# Patient Record
Sex: FEMALE | URBAN - METROPOLITAN AREA
[De-identification: names, ages, dates, MRNs, and addresses within clinical notes are randomized per-mention and may not be internally consistent; named-entity substitution may affect disease eponyms.]

---

## 2018-11-19 ENCOUNTER — APPOINTMENT (RX ONLY)
Dept: URBAN - METROPOLITAN AREA CLINIC 9 | Facility: CLINIC | Age: 23
Setting detail: DERMATOLOGY
End: 2018-11-19

## 2018-11-19 DIAGNOSIS — L81.4 OTHER MELANIN HYPERPIGMENTATION: ICD-10-CM

## 2018-11-19 PROCEDURE — ? PRESCRIPTION SAMPLES PROVIDED

## 2018-11-19 PROCEDURE — ? COUNSELING

## 2018-11-19 PROCEDURE — 99202 OFFICE O/P NEW SF 15 MIN: CPT

## 2018-11-19 PROCEDURE — ? RECOMMENDATIONS

## 2018-11-19 PROCEDURE — ? TREATMENT REGIMEN

## 2018-11-19 ASSESSMENT — LOCATION DETAILED DESCRIPTION DERM
LOCATION DETAILED: LEFT SUPERIOR CENTRAL MALAR CHEEK
LOCATION DETAILED: RIGHT SUPERIOR CENTRAL MALAR CHEEK

## 2018-11-19 ASSESSMENT — LOCATION SIMPLE DESCRIPTION DERM
LOCATION SIMPLE: RIGHT CHEEK
LOCATION SIMPLE: LEFT CHEEK

## 2018-11-19 ASSESSMENT — LOCATION ZONE DERM: LOCATION ZONE: FACE

## 2018-11-19 NOTE — PROCEDURE: TREATMENT REGIMEN
Initiate Treatment: Cetaphil cleanser
Continue Regimen: Zyrtec daily
Discontinue Regimen: Face wipes and facial scrubs
Otc Regimen: Aquaphor twice daily
Detail Level: Zone

## 2018-11-19 NOTE — PROCEDURE: RECOMMENDATIONS
Detail Level: Zone
Recommendations (Free Text): See ophthalmologist for prescription eye drops. Also recommend to talk with Aurelia for free consultation
Recommendation Preamble: The following recommendations were made during the visit:

## 2018-11-19 NOTE — HPI: DISCOLORATION
Additional History: She has had allergies since age 14. She admits to often rubbing her eyes. She uses beaded scrubs and make up removing wipes.

## 2018-12-03 ENCOUNTER — APPOINTMENT (RX ONLY)
Dept: URBAN - METROPOLITAN AREA CLINIC 9 | Facility: CLINIC | Age: 23
Setting detail: DERMATOLOGY
End: 2018-12-03

## 2018-12-03 DIAGNOSIS — L81.4 OTHER MELANIN HYPERPIGMENTATION: ICD-10-CM | Status: IMPROVED

## 2018-12-03 DIAGNOSIS — Z41.9 ENCOUNTER FOR PROCEDURE FOR PURPOSES OTHER THAN REMEDYING HEALTH STATE, UNSPECIFIED: ICD-10-CM

## 2018-12-03 PROCEDURE — ? TREATMENT REGIMEN

## 2018-12-03 PROCEDURE — 99213 OFFICE O/P EST LOW 20 MIN: CPT

## 2018-12-03 PROCEDURE — ? ADDITIONAL NOTES

## 2018-12-03 PROCEDURE — ? COSMETIC CONSULTATION: CHEMICAL PEELS

## 2018-12-03 PROCEDURE — ? COUNSELING

## 2018-12-03 PROCEDURE — ? CHEMICAL PEEL

## 2018-12-03 ASSESSMENT — LOCATION ZONE DERM: LOCATION ZONE: FACE

## 2018-12-03 ASSESSMENT — LOCATION DETAILED DESCRIPTION DERM
LOCATION DETAILED: RIGHT SUPERIOR CENTRAL MALAR CHEEK
LOCATION DETAILED: LEFT SUPERIOR CENTRAL MALAR CHEEK

## 2018-12-03 ASSESSMENT — LOCATION SIMPLE DESCRIPTION DERM
LOCATION SIMPLE: LEFT CHEEK
LOCATION SIMPLE: RIGHT CHEEK

## 2018-12-03 NOTE — PROCEDURE: CHEMICAL PEEL
Number Of Layers: 3
Treatment Number: 0
Detail Level: Zone
Treatment Time (Optional): 6 hrs
Post-Care Instructions: I reviewed with the patient in detail post-care instructions. Patient should avoid sun exposure and wear sun protection.
Prep: The treated area was degreased and vaseline was applied for protection of mucous membranes.
Comments: MICRONAGE PEEL \\n0.05 peel solution with 0.05 Arbutin\\n\\n
Chemical Peel: Micropeel 20 Solution
Post Peel Care: After the procedure sun protection  was applied and post-care instructions were reviewed with the patient.
Consent: Prior to the procedure, written consent was obtained and risks were reviewed, including but not limited to: redness, peeling, blistering, pigmentary change, scarring, infection, and pain.

## 2018-12-03 NOTE — PROCEDURE: TREATMENT REGIMEN
Detail Level: Zone
Continue Regimen: Cetaphil cleanser and Zyrtec daily and allergy drops
Discontinue Regimen: Face wipes and facial scrubs and cloderm
Otc Regimen: Aquaphor twice daily
Plan: Aurelia to have cosmetic consultation today regarding pigmentation surrounding eyes for management.

## 2018-12-03 NOTE — PROCEDURE: ADDITIONAL NOTES
Additional Notes: \\nDiscussed hyperpigmentation causes and treatment. \\nDiscussed multiple peels may be needed. \\nDiscussed maintenance of Obagi Vit C / 4% HQ serum\\n\\n
Detail Level: Simple